# Patient Record
Sex: FEMALE | Race: WHITE | Employment: OTHER | URBAN - NONMETROPOLITAN AREA
[De-identification: names, ages, dates, MRNs, and addresses within clinical notes are randomized per-mention and may not be internally consistent; named-entity substitution may affect disease eponyms.]

---

## 2017-04-12 ENCOUNTER — TELEPHONE (OUTPATIENT)
Dept: AUDIOLOGY | Age: 72
End: 2017-04-12

## 2017-04-12 DIAGNOSIS — H92.13 EAR DRAINAGE, BILATERAL: Primary | ICD-10-CM

## 2017-04-13 ENCOUNTER — TELEPHONE (OUTPATIENT)
Dept: OTOLARYNGOLOGY | Age: 72
End: 2017-04-13

## 2017-04-13 ENCOUNTER — OFFICE VISIT (OUTPATIENT)
Dept: AUDIOLOGY | Age: 72
End: 2017-04-13

## 2017-04-13 ENCOUNTER — OFFICE VISIT (OUTPATIENT)
Dept: OTOLARYNGOLOGY | Age: 72
End: 2017-04-13

## 2017-04-13 VITALS
HEIGHT: 60 IN | SYSTOLIC BLOOD PRESSURE: 108 MMHG | HEART RATE: 76 BPM | DIASTOLIC BLOOD PRESSURE: 76 MMHG | BODY MASS INDEX: 21.99 KG/M2 | RESPIRATION RATE: 20 BRPM | TEMPERATURE: 97.7 F | WEIGHT: 112 LBS

## 2017-04-13 DIAGNOSIS — H61.23 CERUMEN DEBRIS ON TYMPANIC MEMBRANE, BILATERAL: ICD-10-CM

## 2017-04-13 DIAGNOSIS — H90.3 SENSORINEURAL HEARING LOSS, BILATERAL: Primary | ICD-10-CM

## 2017-04-13 DIAGNOSIS — L29.9 ITCHING OF EAR: Primary | ICD-10-CM

## 2017-04-13 PROCEDURE — 99203 OFFICE O/P NEW LOW 30 MIN: CPT | Performed by: OTOLARYNGOLOGY

## 2017-04-13 PROCEDURE — G8400 PT W/DXA NO RESULTS DOC: HCPCS | Performed by: OTOLARYNGOLOGY

## 2017-04-13 PROCEDURE — 1123F ACP DISCUSS/DSCN MKR DOCD: CPT | Performed by: OTOLARYNGOLOGY

## 2017-04-13 PROCEDURE — 4040F PNEUMOC VAC/ADMIN/RCVD: CPT | Performed by: OTOLARYNGOLOGY

## 2017-04-13 PROCEDURE — G8419 CALC BMI OUT NRM PARAM NOF/U: HCPCS | Performed by: OTOLARYNGOLOGY

## 2017-04-13 PROCEDURE — G8427 DOCREV CUR MEDS BY ELIG CLIN: HCPCS | Performed by: OTOLARYNGOLOGY

## 2017-04-13 PROCEDURE — 3017F COLORECTAL CA SCREEN DOC REV: CPT | Performed by: OTOLARYNGOLOGY

## 2017-04-13 PROCEDURE — 4004F PT TOBACCO SCREEN RCVD TLK: CPT | Performed by: OTOLARYNGOLOGY

## 2017-04-13 PROCEDURE — 1090F PRES/ABSN URINE INCON ASSESS: CPT | Performed by: OTOLARYNGOLOGY

## 2017-04-13 PROCEDURE — 3014F SCREEN MAMMO DOC REV: CPT | Performed by: OTOLARYNGOLOGY

## 2017-04-13 RX ORDER — M-VIT,TX,IRON,MINS/CALC/FOLIC 27MG-0.4MG
1 TABLET ORAL DAILY
COMMUNITY

## 2017-04-13 RX ORDER — SUMATRIPTAN 100 MG/1
100 TABLET, FILM COATED ORAL
COMMUNITY

## 2017-04-13 RX ORDER — TRAMADOL HYDROCHLORIDE 50 MG/1
50 TABLET ORAL EVERY 6 HOURS PRN
COMMUNITY

## 2017-04-13 RX ORDER — OMEPRAZOLE 20 MG/1
20 CAPSULE, DELAYED RELEASE ORAL 2 TIMES DAILY
COMMUNITY

## 2017-04-13 RX ORDER — PRAVASTATIN SODIUM 80 MG/1
80 TABLET ORAL DAILY
COMMUNITY

## 2017-04-13 RX ORDER — CALCIUM CARBONATE 500(1250)
500 TABLET ORAL 2 TIMES DAILY
COMMUNITY

## 2017-04-13 RX ORDER — ALPRAZOLAM 1 MG/1
1 TABLET ORAL 2 TIMES DAILY
COMMUNITY

## 2017-04-13 RX ORDER — FLUOCINOLONE ACETONIDE 0.11 MG/ML
3 OIL AURICULAR (OTIC) PRN
Qty: 20 ML | Refills: 2 | Status: SHIPPED | OUTPATIENT
Start: 2017-04-13 | End: 2020-07-01 | Stop reason: ALTCHOICE

## 2017-04-13 ASSESSMENT — ENCOUNTER SYMPTOMS: BACK PAIN: 1

## 2017-09-06 ENCOUNTER — HOSPITAL ENCOUNTER (OUTPATIENT)
Dept: WOMENS IMAGING | Age: 72
Discharge: HOME | End: 2017-09-06
Payer: MEDICARE

## 2017-09-06 DIAGNOSIS — Z12.31 VISIT FOR SCREENING MAMMOGRAM: ICD-10-CM

## 2017-09-06 PROCEDURE — 77063 BREAST TOMOSYNTHESIS BI: CPT

## 2018-09-07 ENCOUNTER — HOSPITAL ENCOUNTER (OUTPATIENT)
Dept: WOMENS IMAGING | Age: 73
Discharge: HOME | End: 2018-09-07
Payer: MEDICARE

## 2018-09-07 DIAGNOSIS — Z12.31 VISIT FOR SCREENING MAMMOGRAM: ICD-10-CM

## 2018-09-07 PROCEDURE — 77063 BREAST TOMOSYNTHESIS BI: CPT

## 2019-09-10 ENCOUNTER — HOSPITAL ENCOUNTER (OUTPATIENT)
Dept: WOMENS IMAGING | Age: 74
Discharge: HOME | End: 2019-09-10
Payer: MEDICARE

## 2019-09-10 DIAGNOSIS — Z12.31 VISIT FOR SCREENING MAMMOGRAM: ICD-10-CM

## 2019-09-10 PROCEDURE — 77063 BREAST TOMOSYNTHESIS BI: CPT

## 2020-06-30 PROBLEM — N63.20 LEFT BREAST MASS: Status: ACTIVE | Noted: 2020-06-30

## 2020-06-30 NOTE — PROGRESS NOTES
Walter Morelos MD [facility]  General Surgery  New Patient Evaluation in Office  Pt Name: Javid Parrish  Date of Birth 1945   Today's Date: 7/1/2020  Medical Record Number: [id number]  Referring Provider: No ref. provider found  Primary Care Provider: Anca Hermosillo  Chief Complaint   Patient presents with   [facility] Surgical Consult     Est patient-s/p right breast lumpectomy 5/2001-lump left breast-PITER 9/10/2019     ASSESSMENT      Problem List Items Addressed This Visit     Left breast mass    Relevant Orders    DARIUS HARRY DIGITAL DIAGNOSTIC UNILATERAL LEFT    US BREAST COMPLETE LEFT      Other Visit Diagnoses     Unspecified lump in left breast, subareolar         Relevant Orders    DARIUS HARRY DIGITAL DIAGNOSTIC UNILATERAL LEFT    US BREAST COMPLETE LEFT        Past Medical History:   Diagnosis Date    Anxiety     Breast cancer ([address]     Chronic back pain     Diabetes ([address]     High cholesterol           PLANS      1. Schedule Coral Champion for   1. Left breast mammo, and US  2. She said she felt a mass left breast subareolar about 6:00  3. I could not feel in office  2. We will then also see her back in 2 weeks to reexamine her but clinically I could appreciate no left breast masses. Orders Placed This Encounter:  Orders Placed This Encounter   Procedures    DARIUS HARRY DIGITAL DIAGNOSTIC UNILATERAL LEFT     Standing Status:   Future     Standing Expiration Date:   9/1/2021    US BREAST COMPLETE LEFT     Standing Status:   Future     Standing Expiration Date:   7/1/2021   3. Delaney Estrada is a 76 y.o.  female seen in the office for evaluation of a breast mass on the left. She states that a couple nights ago she had some breast pain so she can felt around and felt a lump underneath the nipple alar complex left side she tender points to the lower half area. She said it felt about a centimeter in size was painful.   She did have a mammogram in September of last year which on the left was unremarkable for any new findings. She is out since she found a breast mass and had a previous right breast cancer she wanted to be evaluated. .  We have seen her in the remote past where she had a lumpectomy on the left breast in 2004 and a partial thyroidectomy but these have both been many years ago. Her last mammogram was September 2019 were postop changes are seen in the right breast but no findings suspicious in the left breast.  Past Medical History  Past Medical History:   Diagnosis Date    Anxiety     Breast [address] ([address]     Chronic back pain     Diabetes ([address]     High cholesterol        Past Surgical History  Past Surgical History:   Procedure Laterality Date    BREAST SURGERY Right 2004    [facility] lumpectomy    THYROIDECTOMY, PARTIAL Right        Medications  Current Outpatient Medications on File Prior to Visit   Medication Sig Dispense Refill    gabapentin (NEURONTIN) 100 MG capsule       pravastatin (PRAVACHOL) 80 MG tablet Take 80 mg by mouth daily      omeprazole (PRILOSEC) 20 MG delayed release capsule Take 20 mg by mouth 2 times daily      ALPRAZolam (XANAX) 1 MG tablet Take 1 mg by mouth 2 times daily      SUMAtriptan (IMITREX) 100 MG tablet Take 100 mg by mouth once as needed for Migraine      traMADol (ULTRAM) 50 MG tablet Take 50 mg by mouth every 6 hours as needed for Pain      calcium carbonate (OSCAL) 500 MG TABS tablet Take 500 mg by mouth 2 times daily      Multiple Vitamins-Minerals (THERAPEUTIC MULTIVITAMIN-MINERALS) tablet Take 1 tablet by mouth daily      aspirin 81 MG tablet Take 81 mg by mouth daily       No current facility-administered medications on file prior to visit. Allergies  Allergies   Allergen Reactions    Amoxicillin Other (See Comments)     Unknown to patient, long time ago       Family History  History reviewed. No pertinent family history.     Social History  Social History     Socioeconomic History    Marital status:      Spouse name: change, chills, diaphoresis, fatigue, fever and unexpected weight change. HENT: Negative for congestion, dental problem, drooling, ear discharge, ear pain, facial swelling, hearing loss, mouth sores, nosebleeds, postnasal drip, rhinorrhea, sinus pressure, sneezing, sore throat, tinnitus, trouble swallowing and voice change. Eyes: Negative for photophobia, pain, discharge, redness, itching and visual disturbance. Respiratory: Negative for apnea, cough, choking, chest tightness, shortness of breath, wheezing and stridor. Cardiovascular: Negative for chest pain, palpitations and leg swelling. Gastrointestinal: Negative for abdominal distention, abdominal pain, anal bleeding, blood in stool, constipation, diarrhea, nausea, rectal pain and vomiting. Genitourinary: Negative for decreased urine volume, difficulty urinating, dyspareunia, dysuria, enuresis, flank pain, frequency, genital sores, hematuria, menstrual problem, pelvic pain, urgency, vaginal bleeding, vaginal discharge and vaginal pain. Musculoskeletal: Negative for arthralgias, back pain, gait problem, joint swelling, myalgias, neck pain and neck stiffness. Skin: Negative for color change, pallor, rash and wound. Neurological: Negative for dizziness, tremors, seizures, syncope, facial asymmetry, speech difficulty, weakness, light-headedness, numbness and headaches. Hematological: Negative for adenopathy. Does not bruise/bleed easily. Psychiatric/Behavioral: Negative for agitation, behavioral problems, confusion, decreased concentration, dysphoric mood, hallucinations, self-injury, sleep disturbance and suicidal ideas. The patient is not nervous/anxious and is not hyperactive      OBJECTIVE    VITALS:  height is 5' (1.524 m) and weight is 133 lb 12.8 oz (60.7 kg). Her temporal temperature is 98 °F (36.7 °C). Her blood pressure is 122/74 and her pulse is 88. Her respiration is 18 and oxygen saturation is 96%.    CONSTITUTIONAL: Alert and

## 2020-07-01 ENCOUNTER — OFFICE VISIT (OUTPATIENT)
Dept: SURGERY | Age: 75
End: 2020-07-01
Payer: MEDICARE

## 2020-07-01 VITALS
RESPIRATION RATE: 18 BRPM | WEIGHT: 133.8 LBS | HEIGHT: 60 IN | TEMPERATURE: 98 F | BODY MASS INDEX: 26.27 KG/M2 | DIASTOLIC BLOOD PRESSURE: 74 MMHG | HEART RATE: 88 BPM | OXYGEN SATURATION: 96 % | SYSTOLIC BLOOD PRESSURE: 122 MMHG

## 2020-07-01 PROCEDURE — 4004F PT TOBACCO SCREEN RCVD TLK: CPT | Performed by: SURGERY

## 2020-07-01 PROCEDURE — 1123F ACP DISCUSS/DSCN MKR DOCD: CPT | Performed by: SURGERY

## 2020-07-01 PROCEDURE — G8417 CALC BMI ABV UP PARAM F/U: HCPCS | Performed by: SURGERY

## 2020-07-01 PROCEDURE — G8427 DOCREV CUR MEDS BY ELIG CLIN: HCPCS | Performed by: SURGERY

## 2020-07-01 PROCEDURE — 99202 OFFICE O/P NEW SF 15 MIN: CPT | Performed by: SURGERY

## 2020-07-01 PROCEDURE — 4040F PNEUMOC VAC/ADMIN/RCVD: CPT | Performed by: SURGERY

## 2020-07-01 PROCEDURE — G8400 PT W/DXA NO RESULTS DOC: HCPCS | Performed by: SURGERY

## 2020-07-01 PROCEDURE — 1090F PRES/ABSN URINE INCON ASSESS: CPT | Performed by: SURGERY

## 2020-07-01 PROCEDURE — 3017F COLORECTAL CA SCREEN DOC REV: CPT | Performed by: SURGERY

## 2020-07-01 RX ORDER — GABAPENTIN 100 MG/1
CAPSULE ORAL
COMMUNITY
Start: 2020-05-22

## 2020-07-01 ASSESSMENT — ENCOUNTER SYMPTOMS
SORE THROAT: 0
BACK PAIN: 0
PHOTOPHOBIA: 0
BLOOD IN STOOL: 0
EYE REDNESS: 0
VOMITING: 0
CHEST TIGHTNESS: 0
RECTAL PAIN: 0
DIARRHEA: 0
ABDOMINAL PAIN: 0
CHOKING: 0
ABDOMINAL DISTENTION: 0
CONSTIPATION: 0
COLOR CHANGE: 0
NAUSEA: 0
STRIDOR: 0
EYE DISCHARGE: 0
RHINORRHEA: 0
WHEEZING: 0
SINUS PRESSURE: 0
SHORTNESS OF BREATH: 0
FACIAL SWELLING: 0
APNEA: 0
ANAL BLEEDING: 0
TROUBLE SWALLOWING: 0
EYE PAIN: 0
COUGH: 0
VOICE CHANGE: 0
EYE ITCHING: 0

## 2020-07-01 NOTE — LETTER
[facility]  Bob Shields MD FACS  Phone- [phone]  Fax [phone]    Pt Name: Paul Manley Record Number: [id number]  Date of Birth 1945   Today's Date: 7/1/2020    Nisa Frankel was evaluated in the office today. My assessment and plans are listed below. Assessment:     Saurav Joseph was seen today for surgical consult. Diagnoses and all orders for this visit:    Left breast mass  -     DARIUS HARRY DIGITAL DIAGNOSTIC UNILATERAL LEFT; Future  -     US BREAST COMPLETE LEFT; Future    Unspecified lump in left breast, subareolar   -     DARIUS HARRY DIGITAL DIAGNOSTIC UNILATERAL LEFT; Future  -     US BREAST COMPLETE LEFT; Future         Plan:  1. Schedule Saurav Joseph for   1. Left breast mammo, and US  2. She said she felt a mass left breast subareolar about 6:00  3. I could not feel in office  2. We will then also see her back in 2 weeks to reexamine her but clinically I could appreciate no left breast masses. Orders Placed This Encounter:  Orders Placed This Encounter   Procedures    DARIUS HARRY DIGITAL DIAGNOSTIC UNILATERAL LEFT     Standing Status:   Future     Standing Expiration Date:   9/1/2021    US BREAST COMPLETE LEFT     Standing Status:   Future     Standing Expiration Date:   7/1/2021   3. If I can provide any additional assistance or you have any concerns, please feel free to contact me. Thank you for allowing to participate in the care of your patients. Sincerely,      Bob Shields MD FACS  [address]  [id number] [id number] [facility], [address]  Office: [phone]  Fax: [phone]

## 2020-07-01 NOTE — PROGRESS NOTES
Subjective:      Patient ID: Susan Smith is a 76 y.o. female. Chief Complaint   Patient presents with    Surgical Consult     Est patient-s/p right breast lumpectomy 5/2001-lump left breast-PITER 9/10/2019       HPI    Review of Systems   Constitutional: Negative for activity change, appetite change, chills, diaphoresis, fatigue, fever and unexpected weight change. HENT: Negative for congestion, dental problem, drooling, ear discharge, ear pain, facial swelling, hearing loss, mouth sores, nosebleeds, postnasal drip, rhinorrhea, sinus pressure, sneezing, sore throat, tinnitus, trouble swallowing and voice change. Eyes: Negative for photophobia, pain, discharge, redness, itching and visual disturbance. Respiratory: Negative for apnea, cough, choking, chest tightness, shortness of breath, wheezing and stridor. Cardiovascular: Negative for chest pain, palpitations and leg swelling. Gastrointestinal: Negative for abdominal distention, abdominal pain, anal bleeding, blood in stool, constipation, diarrhea, nausea, rectal pain and vomiting. Genitourinary: Negative for decreased urine volume, difficulty urinating, dyspareunia, dysuria, enuresis, flank pain, frequency, genital sores, hematuria, menstrual problem, pelvic pain, urgency, vaginal bleeding, vaginal discharge and vaginal pain. Musculoskeletal: Negative for arthralgias, back pain, gait problem, joint swelling, myalgias, neck pain and neck stiffness. Skin: Negative for color change, pallor, rash and wound. Neurological: Negative for dizziness, tremors, seizures, syncope, facial asymmetry, speech difficulty, weakness, light-headedness, numbness and headaches. Hematological: Negative for adenopathy. Does not bruise/bleed easily. Psychiatric/Behavioral: Negative for agitation, behavioral problems, confusion, decreased concentration, dysphoric mood, hallucinations, self-injury, sleep disturbance and suicidal ideas.  The patient is not nervous/anxious and is not hyperactive.       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

## 2020-07-06 ENCOUNTER — HOSPITAL ENCOUNTER (OUTPATIENT)
Dept: WOMENS IMAGING | Age: 75
Discharge: HOME | End: 2020-07-06
Payer: MEDICARE

## 2020-07-06 PROCEDURE — G0279 TOMOSYNTHESIS, MAMMO: HCPCS

## 2020-07-06 PROCEDURE — 76642 ULTRASOUND BREAST LIMITED: CPT

## 2020-07-14 NOTE — PROGRESS NOTES
Bob Shields MD [facility]  General Surgery  Patient Evaluation in Office  Pt Name: Mayito Kimball  Date of Birth 1945   Today's Date: 7/15/2020  Medical Record Number: [id number]  Referring Provider: No ref. provider found  Primary Care Provider: Marlyn Rodriguez  Chief Complaint   Patient presents with    Results     Ultrasound and mammogram done on 7/6/2020     ASSESSMENT      Problem List Items Addressed This Visit     Left breast mass - Primary        Past Medical History:   Diagnosis Date    Anxiety     Breast cancer ([address] 2004    right    Chronic back pain     Diabetes ([address]     type 2    Heart palpitations     Dr Slade Domingo Hiatal hernia     High cholesterol           PLANS      1. Schedule Saurav Joseph for no scheduled follow-up  2. She is due for mammogram on the right breast in September  3. She was counseled that if she develops this mass that she felt that then disappeared again to make an office appointment so we can reevaluate her at this time. 4. .  Orders Placed This Encounter:  No orders of the defined types were placed in this encounter. 5.       SUBJECTIVE       Saurav Joseph is a 76 y.o.  female seen in the office initially  for evaluation of a breast mass on July 1 on the left. She states that a couple nights ago she had some breast pain so she can felt around and felt a lump underneath the nipple alar complex left side she tender points to the lower half area. She said it felt about a centimeter in size was painful. She did have a mammogram in September of last year which on the left was unremarkable for any new findings. She is out since she found a breast mass and had a previous right breast cancer she wanted to be evaluated. .  We have seen her in the remote past where she had a lumpectomy on the left breast in 2004 and a partial thyroidectomy but these have both been many years ago.   Her last mammogram was September 2019 were postop changes are seen in the right breast but no findings suspicious in the left breast.  When we saw her we could not find anything on physical exam and neither could she and we sent her for diagnostic mammogram of the left breast and an ultrasound. No abnormalities were noted on the mammogram or the ultrasound. She is back in the office now for 2-week reexamination to see if there is anything palpable. She states that she can no longer feel and has not felt it in 2 weeks and the breast pain she had at that time is resolved as well.   Narrative         IMPRESSION: Ultrasound BI-RADS: 1: Negative    The area of palpable concern should be managed clinically.      There is no sonographic evidence of malignancy.      Return to annual mammogram screening schedule is recommended.      The patient was notified of the results.           #[id number] - US BREAST LIMITED LEFT    ULTRASOUND OF LEFT BREAST: 7/6/2020    CLINICAL: Left breast pain and palpable area.           Comparison is made to exams dated:  7/6/2020 mammogram and    9/10/2019 mammogram - [id number] [id number].      Ultrasound of the left breast was performed.  Gray scale images    of the real-time examination were reviewed.      There are no discrete cystic or solid masses present to    correspond to the palpable abnormality.          Past Medical History  Past Medical History:   Diagnosis Date    Anxiety     Breast cancer ([address] 2004    right    Chronic back pain     Diabetes ([address]     type 2    Heart palpitations     Dr Mascorro Generous Hiatal hernia     High cholesterol        Past Surgical History  Past Surgical History:   Procedure Laterality Date    BREAST SURGERY Right 2004    [facility] lumpectomy-Dr Nguyen    THYROIDECTOMY, PARTIAL Right        Medications  Current Outpatient Medications on File Prior to Visit   Medication Sig Dispense Refill    gabapentin (NEURONTIN) 100 MG capsule       pravastatin (PRAVACHOL) 80 MG tablet Take 80 mg by mouth daily      omeprazole (PRILOSEC) 20 MG delayed release capsule Take 20 mg by mouth 2 times daily      ALPRAZolam (XANAX) 1 MG tablet Take 1 mg by mouth 2 times daily      SUMAtriptan (IMITREX) 100 MG tablet Take 100 mg by mouth once as needed for Migraine      traMADol (ULTRAM) 50 MG tablet Take 50 mg by mouth every 6 hours as needed for Pain      calcium carbonate (OSCAL) 500 MG TABS tablet Take 500 mg by mouth 2 times daily      Multiple Vitamins-Minerals (THERAPEUTIC MULTIVITAMIN-MINERALS) tablet Take 1 tablet by mouth daily      aspirin 81 MG tablet Take 81 mg by mouth daily       No current facility-administered medications on file prior to visit. Allergies  Allergies   Allergen Reactions    Amoxicillin Other (See Comments)     Unknown to patient, long time ago       Family History  History reviewed. No pertinent family history.     Social History  Social History     Socioeconomic History    Marital status:      Spouse name: Not on file    Number of children: Not on file    Years of education: Not on file    Highest education level: Not on file   Occupational History    Not on file   Social Needs    Financial resource strain: Not on file    Food insecurity     Worry: Not on file     Inability: Not on file    Transportation needs     Medical: Not on file     Non-medical: Not on file   Tobacco Use    Smoking status: Current Every Day Smoker     Packs/day: 0.75    Smokeless tobacco: Never Used   Substance and Sexual Activity    Alcohol use: No    Drug use: No    Sexual activity: Not on file   Lifestyle    Physical activity     Days per week: Not on file     Minutes per session: Not on file    Stress: Not on file   Relationships    Social connections     Talks on phone: Not on file     Gets together: Not on file     Attends [employer] service: Not on file     Active member of club or organization: Not on file     Attends meetings of clubs or organizations: Not on file     Relationship status: Not on file    Intimate partner violence     Fear of current or ex partner: Not on file     Emotionally abused: Not on file     Physically abused: Not on file     Forced sexual activity: Not on file   Other Topics Concern    Not on file   Social History Narrative    Not on file       [address] Maintenance   Topic Date Due    Hepatitis C screen  1945    Lipid screen  07/14/1955    Colon cancer screen colonoscopy  07/14/1995    Pneumococcal 65+ years Vaccine (2 of 2 - PPSV23) 07/14/2010    Shingles Vaccine (2 of 3) 01/04/2014    Annual Wellness Visit (AWV)  07/01/2020    Flu vaccine (1) 09/01/2020    DTaP/Tdap/Td vaccine (2 - Td) 11/14/2029    DEXA (modify frequency per FRAX score)  Completed    Hepatitis A vaccine  Aged Out    Hepatitis B vaccine  Aged Out    Hib vaccine  Aged Out    Meningococcal (ACWY) vaccine  Aged Out       Review of Systems  Constitutional: Negative for activity change, appetite change, chills, diaphoresis, fatigue, fever and unexpected weight change. HENT: Negative for congestion, dental problem, drooling, ear discharge, ear pain, facial swelling, hearing loss, mouth sores, nosebleeds, postnasal drip, rhinorrhea, sinus pressure, sneezing, sore throat, tinnitus, trouble swallowing and voice change. Eyes: Negative for photophobia, pain, discharge, redness, itching and visual disturbance. Respiratory: Negative for apnea, cough, choking, chest tightness, shortness of breath, wheezing and stridor. Cardiovascular: Negative for chest pain, palpitations and leg swelling. Gastrointestinal: Negative for abdominal distention, abdominal pain, anal bleeding, blood in stool, constipation, diarrhea, nausea, rectal pain and vomiting. Genitourinary: Negative for decreased urine volume, difficulty urinating, dyspareunia, dysuria, enuresis, flank pain, frequency, genital sores, hematuria, menstrual problem, pelvic pain, urgency, vaginal bleeding, vaginal discharge and vaginal pain. Musculoskeletal: Negative for arthralgias, back pain, gait problem, joint swelling, myalgias, neck pain and neck stiffness. Skin: Negative for color change, pallor, rash and wound. Neurological: Negative for dizziness, tremors, seizures, syncope, facial asymmetry, speech difficulty, weakness, light-headedness, numbness and headaches. Hematological: Negative for adenopathy. Does not bruise/bleed easily. Psychiatric/Behavioral: Negative for agitation, behavioral problems, confusion, decreased concentration, dysphoric mood, hallucinations, self-injury, sleep disturbance and suicidal ideas. The patient is not nervous/anxious and is not hyperactive      OBJECTIVE    VITALS:  height is 5' (1.524 m) and weight is 133 lb (60.3 kg). Her temporal temperature is 96.6 °F (35.9 °C). Her blood pressure is 118/60 and her pulse is 101. Her respiration is 18 and oxygen saturation is 94%. CONSTITUTIONAL: Alert and oriented times 3, no acute distress and cooperative to examination with proper mood and affect. SKIN: Skin color, texture, turgor normal. No rashes or lesions. LYMPH: no supraclavicular nodes, no axillary nodes  . BREAST: Left breast is examined both upright and supine. I tried to have her find out this lump for me so we could examine it both in the supine and upright position she once again could not find anything she showed me about where it was which appeared to be in the nipple areolar complex below the nipple itself somewhere in the 6:00 area but I could not feel anything distinct as a mass. She could not for certain find it either in the office today.   And on exam I could feel nothing suspicious underneath the nipple or underneath the nipple alar complex or anywhere else in the left breast..                   Electronically signed by Mann Torres on 7/15/2020 at 12:58 PM

## 2020-07-15 ENCOUNTER — PREP FOR PROCEDURE (OUTPATIENT)
Dept: SURGERY | Age: 75
End: 2020-07-15

## 2020-07-15 ENCOUNTER — OFFICE VISIT (OUTPATIENT)
Dept: SURGERY | Age: 75
End: 2020-07-15
Payer: MEDICARE

## 2020-07-15 VITALS
OXYGEN SATURATION: 94 % | SYSTOLIC BLOOD PRESSURE: 118 MMHG | RESPIRATION RATE: 18 BRPM | HEIGHT: 60 IN | TEMPERATURE: 96.6 F | HEART RATE: 101 BPM | DIASTOLIC BLOOD PRESSURE: 60 MMHG | BODY MASS INDEX: 26.11 KG/M2 | WEIGHT: 133 LBS

## 2020-07-15 PROCEDURE — G8400 PT W/DXA NO RESULTS DOC: HCPCS | Performed by: SURGERY

## 2020-07-15 PROCEDURE — 1090F PRES/ABSN URINE INCON ASSESS: CPT | Performed by: SURGERY

## 2020-07-15 PROCEDURE — G8427 DOCREV CUR MEDS BY ELIG CLIN: HCPCS | Performed by: SURGERY

## 2020-07-15 PROCEDURE — G8417 CALC BMI ABV UP PARAM F/U: HCPCS | Performed by: SURGERY

## 2020-07-15 PROCEDURE — 3017F COLORECTAL CA SCREEN DOC REV: CPT | Performed by: SURGERY

## 2020-07-15 PROCEDURE — 1123F ACP DISCUSS/DSCN MKR DOCD: CPT | Performed by: SURGERY

## 2020-07-15 PROCEDURE — 99212 OFFICE O/P EST SF 10 MIN: CPT | Performed by: SURGERY

## 2020-07-15 PROCEDURE — 4004F PT TOBACCO SCREEN RCVD TLK: CPT | Performed by: SURGERY

## 2020-07-15 PROCEDURE — 4040F PNEUMOC VAC/ADMIN/RCVD: CPT | Performed by: SURGERY

## 2020-07-15 NOTE — LETTER
[facility]  Bob Shields MD FACS  Phone- [phone]  Fax [phone]    Pt Name: Mayito Kimball  Medical Record Number: [id number]  Date of Birth 1945   Today's Date: 7/16/2020    Nisa Frankel was evaluated in the office today. My assessment and plans are listed below. Assessment:     Saurav Joseph was seen today for results. Diagnoses and all orders for this visit:    Left breast mass         Plan:  1. Schedule Saurav Joseph for no scheduled follow-up  2. She is due for mammogram on the right breast in September  3. She was counseled that if she develops this mass that she felt that then disappeared again to make an office appointment so we can reevaluate her at this time. If I can provide any additional assistance or you have any concerns, please feel free to contact me. Thank you for allowing to participate in the care of your patients. Sincerely,      Bob Shields MD FACS  [id number] [id number]  [place], [address]  Office: [phone]  Fax: [phone]

## 2020-10-14 ENCOUNTER — HOSPITAL ENCOUNTER (OUTPATIENT)
Dept: WOMENS IMAGING | Age: 75
Discharge: HOME | End: 2020-10-14
Payer: MEDICARE

## 2020-10-14 PROCEDURE — 77063 BREAST TOMOSYNTHESIS BI: CPT

## 2021-10-15 ENCOUNTER — HOSPITAL ENCOUNTER (OUTPATIENT)
Dept: WOMENS IMAGING | Age: 76
Discharge: HOME | End: 2021-10-15
Payer: MEDICARE

## 2021-10-15 DIAGNOSIS — Z12.31 VISIT FOR SCREENING MAMMOGRAM: ICD-10-CM

## 2021-10-15 PROCEDURE — 77063 BREAST TOMOSYNTHESIS BI: CPT

## 2022-10-18 ENCOUNTER — HOSPITAL ENCOUNTER (OUTPATIENT)
Dept: WOMENS IMAGING | Age: 77
Discharge: HOME | End: 2022-10-18
Payer: MEDICARE

## 2022-10-18 DIAGNOSIS — Z12.31 VISIT FOR SCREENING MAMMOGRAM: ICD-10-CM

## 2022-10-18 PROCEDURE — 77063 BREAST TOMOSYNTHESIS BI: CPT

## 2023-10-19 ENCOUNTER — HOSPITAL ENCOUNTER (OUTPATIENT)
Dept: WOMENS IMAGING | Age: 78
Discharge: HOME | End: 2023-10-19
Payer: MEDICARE

## 2023-10-19 VITALS — WEIGHT: 127 LBS | BODY MASS INDEX: 24.94 KG/M2 | HEIGHT: 60 IN

## 2023-10-19 DIAGNOSIS — Z12.31 VISIT FOR SCREENING MAMMOGRAM: ICD-10-CM

## 2023-10-19 PROCEDURE — 77063 BREAST TOMOSYNTHESIS BI: CPT

## 2024-12-26 ENCOUNTER — HOSPITAL ENCOUNTER (OUTPATIENT)
Dept: WOMENS IMAGING | Age: 79
Discharge: HOME | End: 2024-12-26
Payer: MEDICARE

## 2024-12-26 DIAGNOSIS — Z12.31 VISIT FOR SCREENING MAMMOGRAM: ICD-10-CM

## 2024-12-26 PROCEDURE — 77063 BREAST TOMOSYNTHESIS BI: CPT
